# Patient Record
Sex: MALE
[De-identification: names, ages, dates, MRNs, and addresses within clinical notes are randomized per-mention and may not be internally consistent; named-entity substitution may affect disease eponyms.]

---

## 2021-12-08 ENCOUNTER — TRANSCRIPTION ENCOUNTER (OUTPATIENT)
Age: 31
End: 2021-12-08

## 2021-12-08 ENCOUNTER — APPOINTMENT (OUTPATIENT)
Dept: AFTER HOURS CARE | Facility: EMERGENCY ROOM | Age: 31
End: 2021-12-08
Payer: MEDICAID

## 2021-12-08 DIAGNOSIS — U07.1 COVID-19: ICD-10-CM

## 2021-12-08 DIAGNOSIS — Z86.39 PERSONAL HISTORY OF OTHER ENDOCRINE, NUTRITIONAL AND METABOLIC DISEASE: ICD-10-CM

## 2021-12-08 DIAGNOSIS — I10 ESSENTIAL (PRIMARY) HYPERTENSION: ICD-10-CM

## 2021-12-08 PROBLEM — Z00.00 ENCOUNTER FOR PREVENTIVE HEALTH EXAMINATION: Status: ACTIVE | Noted: 2021-12-08

## 2021-12-08 PROCEDURE — 99443: CPT | Mod: 95

## 2021-12-08 RX ORDER — ONDANSETRON 4 MG/1
4 TABLET ORAL
Qty: 15 | Refills: 0 | Status: ACTIVE | COMMUNITY
Start: 2021-12-08 | End: 1900-01-01

## 2021-12-08 NOTE — REVIEW OF SYSTEMS
[Chills] : chills [Cough] : cough [Nausea] : nausea [Muscle Pain] : muscle pain [Headache] : headache [Fever] : no fever [Discharge] : no discharge [Vision Problems] : no vision problems [Earache] : no earache [Nasal Discharge] : no nasal discharge [Chest Pain] : no chest pain [Shortness Of Breath] : no shortness of breath [Abdominal Pain] : no abdominal pain [Diarrhea] : no diarrhea [Vomiting] : no vomiting [Dysuria] : no dysuria [Hematuria] : no hematuria [Joint Pain] : no joint pain [Back Pain] : no back pain [Itching] : no itching [Skin Rash] : no skin rash [Dizziness] : no dizziness

## 2021-12-08 NOTE — HISTORY OF PRESENT ILLNESS
[Home] : at home, [unfilled] , at the time of the visit. [Other Location: e.g. Home (Enter Location, City,State)___] : at [unfilled] [Mother] : mother [Verbal consent obtained from patient] : the patient, [unfilled] [FreeTextEntry8] : 30 yo M with hx of HLD HTN not vaccinaed on day 9 of covid not feelin any better requesting monoclonal antibody. Pt compalin of nausea aches and not tolerating significant oral intake.

## 2021-12-08 NOTE — PLAN
[FreeTextEntry1] : 30 yo M with hx of HTN HLD not vaccinated now has covid for 9 days and luis has nausea not in distress requesting MAB . \par -- Supportive care-- \par -- Zofran for Nausea\par -- MAb referral

## 2021-12-08 NOTE — PHYSICAL EXAM
[No Acute Distress] : no acute distress [No Respiratory Distress] : no respiratory distress  [Normal Insight/Judgement] : insight and judgment were intact [de-identified] : eval was done over phone could not see the patient and appeared not to be in distresss  [de-identified] : able to coordiante using the phone  [de-identified] : able to carry full conversation without having to pause or take a breath or cough  [de-identified] : able to carry the phone using his upper ext  [de-identified] : alert and clear speech and insght